# Patient Record
Sex: MALE | Race: WHITE | NOT HISPANIC OR LATINO | Employment: UNEMPLOYED | URBAN - METROPOLITAN AREA
[De-identification: names, ages, dates, MRNs, and addresses within clinical notes are randomized per-mention and may not be internally consistent; named-entity substitution may affect disease eponyms.]

---

## 2017-01-12 ENCOUNTER — TELEPHONE (OUTPATIENT)
Dept: UROLOGY | Facility: CLINIC | Age: 62
End: 2017-01-12

## 2017-01-12 DIAGNOSIS — N39.0 URINARY TRACT INFECTION, SITE NOT SPECIFIED: Primary | ICD-10-CM

## 2017-01-12 NOTE — TELEPHONE ENCOUNTER
Called patient to clarify s/s- patient is having frequency, burning, and discomfort since last night. No other s/s noted. U/A and Culture orders placed. Recommended increase in water and suggested AZO OTC for discomfort. Patient verbalized understanding.

## 2017-01-12 NOTE — TELEPHONE ENCOUNTER
----- Message from Edith Downey sent at 1/12/2017  3:51 PM CST -----  Contact: Self  X  1st Request  _  2nd Request  _  3rd Request        Who: ALBERTINA BLOOM [[id number]]    Why: Pt is calling to see if he can be seen sooner than February for ETH.  Pt says that on last night he experienced frequent urinating and burning while urinating with little urine passing.  Please contact pt to further discuss and advise.    What Number to Call Back: [phone]    When to Expect a call back: (Before the end of the day)   -- if call after 3:00 call back will be tomorrow.

## 2017-01-13 ENCOUNTER — LAB VISIT (OUTPATIENT)
Dept: LAB | Facility: HOSPITAL | Age: 62
End: 2017-01-13
Attending: UROLOGY
Payer: COMMERCIAL

## 2017-01-13 ENCOUNTER — TELEPHONE (OUTPATIENT)
Dept: UROLOGY | Facility: HOSPITAL | Age: 62
End: 2017-01-13

## 2017-01-13 DIAGNOSIS — N39.0 URINARY TRACT INFECTION, SITE NOT SPECIFIED: ICD-10-CM

## 2017-01-13 LAB
BACTERIA #/AREA URNS HPF: ABNORMAL /HPF
BILIRUB UR QL STRIP: NEGATIVE
CLARITY UR: CLEAR
COLOR UR: ABNORMAL
GLUCOSE UR QL STRIP: NEGATIVE
HGB UR QL STRIP: NEGATIVE
KETONES UR QL STRIP: NEGATIVE
LEUKOCYTE ESTERASE UR QL STRIP: ABNORMAL
MICROSCOPIC COMMENT: ABNORMAL
NITRITE UR QL STRIP: POSITIVE
NON-SQ EPI CELLS #/AREA URNS HPF: 3 /HPF
PH UR STRIP: 6 [PH] (ref 5–8)
PROT UR QL STRIP: NEGATIVE
RBC #/AREA URNS HPF: 1 /HPF (ref 0–4)
SP GR UR STRIP: 1.01 (ref 1–1.03)
URN SPEC COLLECT METH UR: ABNORMAL
UROBILINOGEN UR STRIP-ACNC: ABNORMAL EU/DL
WBC #/AREA URNS HPF: 12 /HPF (ref 0–5)
WBC CLUMPS URNS QL MICRO: ABNORMAL

## 2017-01-13 PROCEDURE — 81000 URINALYSIS NONAUTO W/SCOPE: CPT

## 2017-01-13 PROCEDURE — 87186 SC STD MICRODIL/AGAR DIL: CPT

## 2017-01-13 PROCEDURE — 87086 URINE CULTURE/COLONY COUNT: CPT

## 2017-01-13 PROCEDURE — 87077 CULTURE AEROBIC IDENTIFY: CPT

## 2017-01-13 PROCEDURE — 87088 URINE BACTERIA CULTURE: CPT

## 2017-01-13 RX ORDER — CEPHALEXIN 500 MG/1
500 CAPSULE ORAL EVERY 8 HOURS
Qty: 30 CAPSULE | Refills: 0 | Status: SHIPPED | OUTPATIENT
Start: 2017-01-13 | End: 2017-01-23

## 2017-01-13 NOTE — TELEPHONE ENCOUNTER
UA concerning for UTI. Will start empiric abx. May need to adjust when UCx returns.     Keflex sent to pharmacy.

## 2017-01-13 NOTE — TELEPHONE ENCOUNTER
Patient wife notified of medication sent to pharmacy. Also explained medication may have to be changed once final culture report comes in.

## 2017-01-16 ENCOUNTER — TELEPHONE (OUTPATIENT)
Dept: UROLOGY | Facility: HOSPITAL | Age: 62
End: 2017-01-16

## 2017-01-16 LAB — BACTERIA UR CULT: NORMAL

## 2017-01-16 RX ORDER — SULFAMETHOXAZOLE AND TRIMETHOPRIM 800; 160 MG/1; MG/1
1 TABLET ORAL 2 TIMES DAILY
Qty: 14 TABLET | Refills: 0 | Status: SHIPPED | OUTPATIENT
Start: 2017-01-16 | End: 2017-01-23

## 2017-01-16 NOTE — TELEPHONE ENCOUNTER
Please inform patient of urinary tract infection on recent urine culture. Appropriate antibiotics (Bactrim) were sent in to the pharmacy.    Stop Keflex - UTI is resistant

## 2017-03-06 RX ORDER — OXYBUTYNIN CHLORIDE 10 MG/1
TABLET, EXTENDED RELEASE ORAL
Qty: 90 TABLET | Refills: 3 | Status: SHIPPED | OUTPATIENT
Start: 2017-03-06 | End: 2017-07-03 | Stop reason: SDUPTHER

## 2017-04-03 RX ORDER — TAMSULOSIN HYDROCHLORIDE 0.4 MG/1
0.4 CAPSULE ORAL DAILY
Qty: 90 CAPSULE | Refills: 3 | Status: SHIPPED | OUTPATIENT
Start: 2017-04-03 | End: 2017-07-03 | Stop reason: SDUPTHER

## 2017-07-03 NOTE — TELEPHONE ENCOUNTER
Patient is switching pharmacy services and is requesting refills on meds be sent to new pharmacy.

## 2017-07-05 RX ORDER — TAMSULOSIN HYDROCHLORIDE 0.4 MG/1
0.4 CAPSULE ORAL DAILY
Qty: 90 CAPSULE | Refills: 3 | Status: SHIPPED | OUTPATIENT
Start: 2017-07-05 | End: 2018-05-20 | Stop reason: SDUPTHER

## 2017-07-05 RX ORDER — OXYBUTYNIN CHLORIDE 10 MG/1
TABLET, EXTENDED RELEASE ORAL
Qty: 90 TABLET | Refills: 3 | Status: SHIPPED | OUTPATIENT
Start: 2017-07-05 | End: 2017-07-07

## 2017-07-05 RX ORDER — OXYBUTYNIN CHLORIDE 5 MG/1
5 TABLET, EXTENDED RELEASE ORAL DAILY
Qty: 60 TABLET | Refills: 3 | Status: SHIPPED | OUTPATIENT
Start: 2017-07-05 | End: 2017-07-07

## 2017-07-07 ENCOUNTER — OFFICE VISIT (OUTPATIENT)
Dept: UROLOGY | Facility: CLINIC | Age: 62
End: 2017-07-07
Payer: COMMERCIAL

## 2017-07-07 VITALS
DIASTOLIC BLOOD PRESSURE: 64 MMHG | WEIGHT: 213 LBS | BODY MASS INDEX: 29.82 KG/M2 | SYSTOLIC BLOOD PRESSURE: 128 MMHG | HEART RATE: 68 BPM | HEIGHT: 71 IN | RESPIRATION RATE: 16 BRPM

## 2017-07-07 DIAGNOSIS — N40.0 BENIGN NON-NODULAR PROSTATIC HYPERPLASIA WITHOUT LOWER URINARY TRACT SYMPTOMS: Primary | ICD-10-CM

## 2017-07-07 DIAGNOSIS — N32.81 OAB (OVERACTIVE BLADDER): ICD-10-CM

## 2017-07-07 DIAGNOSIS — N30.10 INTERSTITIAL CYSTITIS: ICD-10-CM

## 2017-07-07 DIAGNOSIS — R35.1 NOCTURIA: ICD-10-CM

## 2017-07-07 PROCEDURE — 99999 PR PBB SHADOW E&M-EST. PATIENT-LVL III: CPT | Mod: PBBFAC,,, | Performed by: UROLOGY

## 2017-07-07 PROCEDURE — 99214 OFFICE O/P EST MOD 30 MIN: CPT | Mod: S$GLB,,, | Performed by: UROLOGY

## 2017-07-07 RX ORDER — OXYBUTYNIN CHLORIDE 15 MG/1
15 TABLET, EXTENDED RELEASE ORAL DAILY
Qty: 90 TABLET | Refills: 3 | Status: SHIPPED | OUTPATIENT
Start: 2017-07-07 | End: 2018-07-07

## 2017-07-07 NOTE — PROGRESS NOTES
Subjective:       Taye Hawthorne is a 61 y.o. male who is an established pt with Dr. Montgomery was seen for evaluation of urinary issues.      Last saw [facility] 10/16. He has a dx of BPH and IC. He has OAB symptoms with significant UUI and frequency. He has been on Ditropan, Flomax, and Elmiron and continues to have UUI with 1-2 ppd. He is s/p cysto/hydrodistention in 2008. Myrbetriq was attempted to be added to medications but was cost-prohibitory. No UDS performed. He is currently taking Elmiron and Ditropan.  Tried Toviaz with no improvement. Discussed Botox previously.     He reports he continues to have some UUI but is improving. Nocturia x 1-2. He is satisfied with his current urination. He was wearing pads daily for this. He does have occasional, unpredictable UUI.     PUS 12/14 - PVR 36cc, prostate 53g.       He has a recent diagnosis metastatic melanoma to lung and left leg. He had treatment for this that was completed in 1/17 and is responding well to treatment. Recently now diagnosed with spinal stenosis, getting epidurals. He has also recently moved to [place] and is looking to change urologists.     PVR (bladder scan) today - 25cc      The following portions of the patient's history were reviewed and updated as appropriate: allergies, current medications, past family history, past medical history, past social history, past surgical history and problem list.    Review of Systems  Constitutional: no fever or chills  ENT: no nasal congestion or sore throat  Respiratory: no cough or shortness of breath  Cardiovascular: no chest pain or palpitations  Gastrointestinal: no nausea or vomiting, tolerating diet  Genitourinary: as per HPI  Hematologic/Lymphatic: no easy bruising or lymphadenopathy  Musculoskeletal: no arthralgias or myalgias  Skin: no rashes or lesions  Neurological: no seizures or tremors  Behavioral/Psych: no auditory or visual hallucinations       Objective:    Vitals:   /64   Pulse 68   Resp 16   " Ht 5' 11" (1.803 m)   Wt 96.6 kg (213 lb)   BMI 29.71 kg/m²     Physical Exam   General: well developed, well nourished in no acute distress  Head: normocephalic, atraumatic  Neck: supple, trachea midline, no obvious enlargement of thyroid  HEENT: EOMI, mucus membranes moist, sclera anicteric, no hearing impairment  Lungs: symmetric expansion, non-labored breathing  Cardiovascular: regular rate and rhythm, normal pulses  Abdomen: soft, non tender, non distended, no palpable masses, no hepatosplenomegaly, no hernias, bladder nonpalpable. No CVA tenderness.  Musculoskeletal: no peripheral edema, normal ROM in bilateral upper and lower extremities  Lymphatics: no cervical or inguinal lymphadenopathy  Skin: no rashes or lesions  Neuro: alert and oriented x 3, no gross deficits  Psych: normal judgment and insight, normal mood/affect and non-anxious  Genitourinary:   patient declined exam   TAQUERIA: patient declined exam      Lab Review   Urine analysis today in clinic shows Negative     Lab Results   Component Value Date    WBC 6.32 03/11/2014    HGB 16.1 03/11/2014    HCT 47.4 03/11/2014    MCV 84 03/11/2014     03/11/2014     Lab Results   Component Value Date    CREATININE 0.9 06/02/2015    BUN 15 06/02/2015     No results found for: PSA  Lab Results   Component Value Date    PSADIAG 1.3 06/02/2015       Imaging  US reviewed  Reports and images were personally reviewed by me and discussed with the patient today         Assessment/Plan:      1. Benign non-nodular prostatic hyperplasia without lower urinary tract symptoms    - Continue Flomax     2. OAB (overactive bladder)    - Myrbetriq cost-prohibitory   - Continue Elmiron and Ditropan - refilled to mail-order   - Continue Ditropan XL 15mg   - Discussed Botox, will hold for now as he is doing well. Consider UDS prior to consideration of Botox.        Follow up PRN       "

## 2018-05-21 RX ORDER — TAMSULOSIN HYDROCHLORIDE 0.4 MG/1
CAPSULE ORAL
Qty: 90 CAPSULE | Refills: 3 | Status: SHIPPED | OUTPATIENT
Start: 2018-05-21